# Patient Record
Sex: FEMALE | Race: WHITE
[De-identification: names, ages, dates, MRNs, and addresses within clinical notes are randomized per-mention and may not be internally consistent; named-entity substitution may affect disease eponyms.]

---

## 2018-04-03 ENCOUNTER — HOSPITAL ENCOUNTER (OUTPATIENT)
Dept: HOSPITAL 89 - LAB | Age: 78
End: 2018-04-03
Attending: INTERNAL MEDICINE
Payer: MEDICARE

## 2018-04-03 VITALS — BODY MASS INDEX: 27.72 KG/M2

## 2018-04-03 DIAGNOSIS — E87.6: Primary | ICD-10-CM

## 2018-04-03 DIAGNOSIS — I10: ICD-10-CM

## 2018-04-03 DIAGNOSIS — E03.9: ICD-10-CM

## 2018-04-03 LAB
LDLC SERPL-MCNC: 131 MG/DL
PLATELET COUNT, AUTOMATED: 273 K/UL (ref 150–450)

## 2018-04-03 PROCEDURE — 84155 ASSAY OF PROTEIN SERUM: CPT

## 2018-04-03 PROCEDURE — 82247 BILIRUBIN TOTAL: CPT

## 2018-04-03 PROCEDURE — 84460 ALANINE AMINO (ALT) (SGPT): CPT

## 2018-04-03 PROCEDURE — 83718 ASSAY OF LIPOPROTEIN: CPT

## 2018-04-03 PROCEDURE — 82435 ASSAY OF BLOOD CHLORIDE: CPT

## 2018-04-03 PROCEDURE — 84443 ASSAY THYROID STIM HORMONE: CPT

## 2018-04-03 PROCEDURE — 84075 ASSAY ALKALINE PHOSPHATASE: CPT

## 2018-04-03 PROCEDURE — 82040 ASSAY OF SERUM ALBUMIN: CPT

## 2018-04-03 PROCEDURE — 85025 COMPLETE CBC W/AUTO DIFF WBC: CPT

## 2018-04-03 PROCEDURE — 82374 ASSAY BLOOD CARBON DIOXIDE: CPT

## 2018-04-03 PROCEDURE — 84450 TRANSFERASE (AST) (SGOT): CPT

## 2018-04-03 PROCEDURE — 84520 ASSAY OF UREA NITROGEN: CPT

## 2018-04-03 PROCEDURE — 82465 ASSAY BLD/SERUM CHOLESTEROL: CPT

## 2018-04-03 PROCEDURE — 82310 ASSAY OF CALCIUM: CPT

## 2018-04-03 PROCEDURE — 84295 ASSAY OF SERUM SODIUM: CPT

## 2018-04-03 PROCEDURE — 82947 ASSAY GLUCOSE BLOOD QUANT: CPT

## 2018-04-03 PROCEDURE — 82565 ASSAY OF CREATININE: CPT

## 2018-04-03 PROCEDURE — 84132 ASSAY OF SERUM POTASSIUM: CPT

## 2018-04-03 PROCEDURE — 84478 ASSAY OF TRIGLYCERIDES: CPT

## 2018-04-03 PROCEDURE — 36415 COLL VENOUS BLD VENIPUNCTURE: CPT

## 2018-06-29 ENCOUNTER — HOSPITAL ENCOUNTER (OUTPATIENT)
Dept: HOSPITAL 89 - LAB | Age: 78
End: 2018-06-29
Attending: INTERNAL MEDICINE
Payer: MEDICARE

## 2018-06-29 VITALS — BODY MASS INDEX: 27.72 KG/M2

## 2018-06-29 DIAGNOSIS — R19.7: Primary | ICD-10-CM

## 2018-06-29 PROCEDURE — 87177 OVA AND PARASITES SMEARS: CPT

## 2018-06-29 PROCEDURE — 87324 CLOSTRIDIUM AG IA: CPT

## 2018-06-29 PROCEDURE — 82274 ASSAY TEST FOR BLOOD FECAL: CPT

## 2018-06-29 PROCEDURE — 83630 LACTOFERRIN FECAL (QUAL): CPT

## 2018-06-29 PROCEDURE — 87045 FECES CULTURE AEROBIC BACT: CPT

## 2018-06-29 PROCEDURE — 87449 NOS EACH ORGANISM AG IA: CPT

## 2018-06-29 PROCEDURE — 87338 HPYLORI STOOL AG IA: CPT

## 2019-06-03 ENCOUNTER — HOSPITAL ENCOUNTER (EMERGENCY)
Dept: HOSPITAL 89 - ER | Age: 79
Discharge: HOME | End: 2019-06-03
Payer: MEDICARE

## 2019-06-03 VITALS — SYSTOLIC BLOOD PRESSURE: 153 MMHG | DIASTOLIC BLOOD PRESSURE: 70 MMHG

## 2019-06-03 VITALS — WEIGHT: 160.5 LBS | BODY MASS INDEX: 27.72 KG/M2

## 2019-06-03 DIAGNOSIS — G43.009: Primary | ICD-10-CM

## 2019-06-03 LAB — PLATELET COUNT, AUTOMATED: 233 K/UL (ref 150–450)

## 2019-06-03 PROCEDURE — 71045 X-RAY EXAM CHEST 1 VIEW: CPT

## 2019-06-03 PROCEDURE — 96374 THER/PROPH/DIAG INJ IV PUSH: CPT

## 2019-06-03 PROCEDURE — 82310 ASSAY OF CALCIUM: CPT

## 2019-06-03 PROCEDURE — 99284 EMERGENCY DEPT VISIT MOD MDM: CPT

## 2019-06-03 PROCEDURE — 82435 ASSAY OF BLOOD CHLORIDE: CPT

## 2019-06-03 PROCEDURE — 84520 ASSAY OF UREA NITROGEN: CPT

## 2019-06-03 PROCEDURE — 84132 ASSAY OF SERUM POTASSIUM: CPT

## 2019-06-03 PROCEDURE — 84075 ASSAY ALKALINE PHOSPHATASE: CPT

## 2019-06-03 PROCEDURE — 70450 CT HEAD/BRAIN W/O DYE: CPT

## 2019-06-03 PROCEDURE — 85025 COMPLETE CBC W/AUTO DIFF WBC: CPT

## 2019-06-03 PROCEDURE — 82947 ASSAY GLUCOSE BLOOD QUANT: CPT

## 2019-06-03 PROCEDURE — 82565 ASSAY OF CREATININE: CPT

## 2019-06-03 PROCEDURE — 82040 ASSAY OF SERUM ALBUMIN: CPT

## 2019-06-03 PROCEDURE — 84450 TRANSFERASE (AST) (SGOT): CPT

## 2019-06-03 PROCEDURE — 81001 URINALYSIS AUTO W/SCOPE: CPT

## 2019-06-03 PROCEDURE — 84460 ALANINE AMINO (ALT) (SGPT): CPT

## 2019-06-03 PROCEDURE — 84295 ASSAY OF SERUM SODIUM: CPT

## 2019-06-03 PROCEDURE — 93005 ELECTROCARDIOGRAM TRACING: CPT

## 2019-06-03 PROCEDURE — 84155 ASSAY OF PROTEIN SERUM: CPT

## 2019-06-03 PROCEDURE — 82374 ASSAY BLOOD CARBON DIOXIDE: CPT

## 2019-06-03 PROCEDURE — 82247 BILIRUBIN TOTAL: CPT

## 2019-06-03 PROCEDURE — 96361 HYDRATE IV INFUSION ADD-ON: CPT

## 2019-06-03 NOTE — ER REPORT
History and Physical


Time Seen By MD:  15:26


HPI/ROS


78 y/o pleasant female with a history of migraine headaches, essential tremor, 


depression, and anxiety. She said she started the day with a mild HA. She and 


her daughter went on errands, and had lunch. After lunch her HA worsened, she 


became anxious, nauseous and weak. Now complains of generalized weakness and a 


HA. Not worse of life. No meningismus. No fever/chills. No abdominal pain.


Remainder of the 14 system rev:  Yes


Allergies:  


Coded Allergies:  


     trazodone (Verified  Allergy, Severe, 6/3/19)


     oxycodone (Verified  Allergy, Intermediate, NAUSEA/VOMITING, 6/3/19)


     hydrocodone (Unverified  Allergy, Unknown, NAUSEA/VOMITING, 6/3/19)


     sertraline (Verified  Adverse Reaction, Severe, Nausea, insomnia, anger, 


irritability, 6/3/19)


     tramadol (Verified  Adverse Reaction, Severe, Tremors and jitteriness, 


6/3/19)


     simvastatin (Verified  Adverse Reaction, Intermediate, leg weakness, 


6/3/19)


Home Meds


Active Scripts


Atenolol/Chlorthalidone (ATENOLOL-CHLORTHAL 50-25 TB) 1 Each Tablet, 1 TAB PO 


QDAY, #90 TAB 2 Refills


   Prov:OLY MEDRANO MD         4/29/19


Mirtazapine (MIRTAZAPINE) 7.5 Mg Tablet, 7.5 MG PO DAILY, #90 TAB 3 Refills


   Prov:OLY MEDRANO MD         1/17/19


Alprazolam (ALPRAZOLAM) 0.25 Mg Tab.rapdis, 1 TAB PO BID, #120 TAB 4 Refills


   Take 1 in am, 1 in afternoon and two at night, total of 4/day.


   Prov:LOY MEDRANO MD         1/17/19


Potassium Chloride (KLOR-CON M20) 20 Meq Tab.er.prt, 1 TAB PO TID, #270 TAB 3 


Refills


   Prov:OLY MEDRANO MD         11/1/18


Promethazine Hcl (PROMETHAZINE HCL) 25 Mg Tablet, 25 MG PO TID PRN for 


NAUSEA/VOMITING, #60 TAB 3 Refills


   Prov:OLY MEDRANO MD         10/30/18


Levothyroxine Sodium (LEVOTHYROXINE SODIUM) 75 Mcg Tablet, 75 MCG PO QDAY, #90 


TAB 4 Refills


   Prov:OLY MEDRANO MD         10/30/18


Famotidine (PEPCID) 20 Mg Tablet, 20 MG PO QDAY, #90 TAB 3 Refills


   Prov:OLY MEDRANO MD         4/10/18


Reported Medications


Magnesium Glycinate (MAG GLYCINATE) 100 Mg Tablet, 4 TAB PO DAILY


   1/12/17


Meclizine Hcl (MECLIZINE HCL) 25 Mg Tablet, 1 TAB PO PRN PRN for DIZZINESS


   9/20/16


Vitamin B Complex (B COMPLEX) 1 Each Tablet, 1 EACH PO QDAY, TAB


   11/11/15


Fish Oil/Dha/Epa (FISH OIL 1,200 MG FISH OIL) 1 Each Capsule, 1 EACH PO BID, 


CAPSULE


   11/10/15


Lutein (LUTEIN) 20 Mg Capsule, 1 CAP PO QDAY, CAPSULE


   11/10/15


Aspirin (ASPIRIN) 325 Mg Tablet, 1 TAB PO QDAY, TAB


   11/10/15


Multivitamin (MULTI VITAMIN DAILY) 1 Each Tablet, 1 EACH PO QDAY


   10/5/15


Reviewed Nurses Notes:  Yes


Old Medical Records Reviewed:  Yes


Hx Smoking:  No


Smoking Status:  Never Smoker


Hx Substance Use Disorder:  No


Constitutional





Vital Sign - Last 24 Hours








 6/3/19 6/3/19 6/3/19 6/3/19





 15:20 15:30 15:45 15:46


 


Temp 98.5   


 


Pulse 76 ??? 73 


 


Resp 18  22 


 


B/P (MAP) 190/93 187/97 (127)  185/90 (121)


 


Pulse Ox 90  87 


 


O2 Delivery Room Air   


 


    





 6/3/19 6/3/19 6/3/19 6/3/19





 16:00 16:15 16:30 16:45


 


Pulse  74 73 69


 


Resp 19 32 17 18


 


B/P (MAP) 177/88 (117) 179/99 (125) 167/82 (110) 165/78 (107)


 


Pulse Ox 94   


 


O2 Delivery Nasal Cannula   


 


O2 Flow Rate 1   





 6/3/19 6/3/19 6/3/19 6/3/19





 16:50 16:55 17:00 17:05


 


Pulse ??? 70 68 68


 


Resp 17 7 14 20


 


B/P (MAP)   153/80 (104) 


 


Pulse Ox 82   





 6/3/19 6/3/19 6/3/19 





 17:10 17:15 17:20 


 


Pulse 68 70 68 


 


Resp 16 17 11 


 


B/P (MAP)  153/70 (97)  








Physical Exam


General Appearance: The patient is alert, has no immediate need for airway 


protection and no current signs of toxicity.  


Eyes: Pupils equal and round no injection.


Respiratory: Chest is non tender, lungs are clear to auscultation.


Cardiac:  RRR, no m/r/g


Gastrointestinal: Abdomen is soft and non tender, no masses, bowel sounds 


normal.


Musculoskeletal:  No focal TTP


Neck: Neck is supple and non tender.


Extremities have full range of motion and are non tender.


Skin: No rashes or lesions.


Neuro: CN in tact, normal strength and sensation, gait at baseline. 





DIFFERENTIAL DIAGNOSIS: After history and physical exam differential diagnosis 


was considered for headache including but not limited to subarachnoid 


hemorrhage, migraine headache, tension headache and infectious causes such as 


meningitis, pharyngitis and sinusitis.





Medical Decision Making


Data Points


Result Diagram:  


6/3/19 1555                                                                     


          6/3/19 1538





Laboratory





Hematology








Test


 6/3/19


15:38 6/3/19


15:55 6/3/19


16:25


 


Sodium Level


 135 mmol/L


(137-145) 


 





 


Potassium Level


 3.4 mmol/L


(3.5-5.0) 


 





 


Chloride Level


 98 mmol/L


() 


 





 


Carbon Dioxide Level


 25 mmol/L


(22-31) 


 





 


Blood Urea Nitrogen


 13 mg/dl


(7-18) 


 





 


Creatinine


 0.70 mg/dl


(0.52-1.04) 


 





 


Glomerular Filtration Rate


Calc > 60.0 


 


 





 


Random Glucose


 96 mg/dl


() 


 





 


Calcium Level


 9.5 mg/dl


(8.4-10.2) 


 





 


Total Bilirubin


 0.4 mg/dl


(0.2-1.3) 


 





 


Aspartate Amino Transf


(AST/SGOT) 37 U/L (0-35) 


 


 





 


Alanine Aminotransferase


(ALT/SGPT) 36 U/L (0-56) 


 


 





 


Alkaline Phosphatase 84 U/L (0-126)   


 


Total Protein


 8.3 g/dl


(6.3-8.2) 


 





 


Albumin


 4.6 g/dl


(3.5-5.0) 


 





 


Red Blood Count


 


 4.73 M/uL


(4.17-5.56) 





 


Mean Corpuscular Volume


 


 86.6 fL


(80.0-96.0) 





 


Mean Corpuscular Hemoglobin


 


 30.2 pg


(26.0-33.0) 





 


Mean Corpuscular Hemoglobin


Concent 


 34.9 g/dL


(32.0-36.0) 





 


Red Cell Distribution Width


 


 14.2 %


(11.5-14.5) 





 


Mean Platelet Volume


 


 7.0 fL


(7.2-11.1) 





 


Neutrophils (%) (Auto)


 


 59.7 %


(39.4-72.5) 





 


Lymphocytes (%) (Auto)


 


 27.2 %


(17.6-49.6) 





 


Monocytes (%) (Auto)


 


 10.1 %


(4.1-12.4) 





 


Eosinophils (%) (Auto)


 


 2.0 %


(0.4-6.7) 





 


Basophils (%) (Auto)


 


 1.0 %


(0.3-1.4) 





 


Nucleated RBC Relative Count


(auto) 


 0.0 /100WBC 


 





 


Neutrophils # (Auto)


 


 5.1 K/uL


(2.0-7.4) 





 


Lymphocytes # (Auto)


 


 2.3 K/uL


(1.3-3.6) 





 


Monocytes # (Auto)


 


 0.9 K/uL


(0.3-1.0) 





 


Eosinophils # (Auto)


 


 0.2 K/uL


(0.0-0.5) 





 


Basophils # (Auto)


 


 0.1 K/uL


(0.0-0.1) 





 


Nucleated RBC Absolute Count


(auto) 


 0.00 K/uL 


 





 


Urine Color   Yellow 


 


Urine Clarity


 


 


 Slightly-cloudy





 


Urine pH


 


 


 7.0 pH


(4.8-9.5)


 


Urine Specific Gravity   1.008 


 


Urine Protein


 


 


 Negative mg/dL


(NEGATIVE)


 


Urine Glucose (UA)


 


 


 Negative mg/dL


(NEGATIVE)


 


Urine Ketones


 


 


 Negative mg/dL


(NEGATIVE)


 


Urine Blood


 


 


 Negative


(NEGATIVE)


 


Urine Nitrite


 


 


 Negative


(NEGATIVE)


 


Urine Bilirubin


 


 


 Negative


(NEGATIVE)


 


Urine Urobilinogen


 


 


 Negative mg/dL


(0.2-1.9)


 


Urine Leukocyte Esterase


 


 


 Moderate


(NEGATIVE)


 


Urine RBC


 


 


 3 /HPF


(0-2/HPF)


 


Urine WBC


 


 


 4 /HPF


(0-5/HPF)


 


Urine Squamous Epithelial


Cells 


 


 None /LPF


(NONE-FEW)


 


Urine Bacteria


 


 


 Negative /HPF


(NONE-FEW)


 


Urine Mucus


 


 


 None /HPF


(NONE-FEW)








Chemistry








Test


 6/3/19


15:38 6/3/19


15:55 6/3/19


16:25


 


Glomerular Filtration Rate


Calc > 60.0 


 


 





 


Calcium Level


 9.5 mg/dl


(8.4-10.2) 


 





 


Total Bilirubin


 0.4 mg/dl


(0.2-1.3) 


 





 


Aspartate Amino Transf


(AST/SGOT) 37 U/L (0-35) 


 


 





 


Alanine Aminotransferase


(ALT/SGPT) 36 U/L (0-56) 


 


 





 


Alkaline Phosphatase 84 U/L (0-126)   


 


Total Protein


 8.3 g/dl


(6.3-8.2) 


 





 


Albumin


 4.6 g/dl


(3.5-5.0) 


 





 


White Blood Count


 


 8.6 k/uL


(4.5-11.0) 





 


Red Blood Count


 


 4.73 M/uL


(4.17-5.56) 





 


Hemoglobin


 


 14.3 g/dL


(12.0-16.0) 





 


Hematocrit


 


 41.0 %


(34.0-47.0) 





 


Mean Corpuscular Volume


 


 86.6 fL


(80.0-96.0) 





 


Mean Corpuscular Hemoglobin


 


 30.2 pg


(26.0-33.0) 





 


Mean Corpuscular Hemoglobin


Concent 


 34.9 g/dL


(32.0-36.0) 





 


Red Cell Distribution Width


 


 14.2 %


(11.5-14.5) 





 


Platelet Count


 


 233 K/uL


(150-450) 





 


Mean Platelet Volume


 


 7.0 fL


(7.2-11.1) 





 


Neutrophils (%) (Auto)


 


 59.7 %


(39.4-72.5) 





 


Lymphocytes (%) (Auto)


 


 27.2 %


(17.6-49.6) 





 


Monocytes (%) (Auto)


 


 10.1 %


(4.1-12.4) 





 


Eosinophils (%) (Auto)


 


 2.0 %


(0.4-6.7) 





 


Basophils (%) (Auto)


 


 1.0 %


(0.3-1.4) 





 


Nucleated RBC Relative Count


(auto) 


 0.0 /100WBC 


 





 


Neutrophils # (Auto)


 


 5.1 K/uL


(2.0-7.4) 





 


Lymphocytes # (Auto)


 


 2.3 K/uL


(1.3-3.6) 





 


Monocytes # (Auto)


 


 0.9 K/uL


(0.3-1.0) 





 


Eosinophils # (Auto)


 


 0.2 K/uL


(0.0-0.5) 





 


Basophils # (Auto)


 


 0.1 K/uL


(0.0-0.1) 





 


Nucleated RBC Absolute Count


(auto) 


 0.00 K/uL 


 





 


Urine Color   Yellow 


 


Urine Clarity


 


 


 Slightly-cloudy





 


Urine pH


 


 


 7.0 pH


(4.8-9.5)


 


Urine Specific Gravity   1.008 


 


Urine Protein


 


 


 Negative mg/dL


(NEGATIVE)


 


Urine Glucose (UA)


 


 


 Negative mg/dL


(NEGATIVE)


 


Urine Ketones


 


 


 Negative mg/dL


(NEGATIVE)


 


Urine Blood


 


 


 Negative


(NEGATIVE)


 


Urine Nitrite


 


 


 Negative


(NEGATIVE)


 


Urine Bilirubin


 


 


 Negative


(NEGATIVE)


 


Urine Urobilinogen


 


 


 Negative mg/dL


(0.2-1.9)


 


Urine Leukocyte Esterase


 


 


 Moderate


(NEGATIVE)


 


Urine RBC


 


 


 3 /HPF


(0-2/HPF)


 


Urine WBC


 


 


 4 /HPF


(0-5/HPF)


 


Urine Squamous Epithelial


Cells 


 


 None /LPF


(NONE-FEW)


 


Urine Bacteria


 


 


 Negative /HPF


(NONE-FEW)


 


Urine Mucus


 


 


 None /HPF


(NONE-FEW)








Urinalysis








Test


 6/3/19


16:25


 


Urine Color Yellow 


 


Urine Clarity


 Slightly-cloudy





 


Urine pH


 7.0 pH


(4.8-9.5)


 


Urine Specific Gravity 1.008 


 


Urine Protein


 Negative mg/dL


(NEGATIVE)


 


Urine Glucose (UA)


 Negative mg/dL


(NEGATIVE)


 


Urine Ketones


 Negative mg/dL


(NEGATIVE)


 


Urine Blood


 Negative


(NEGATIVE)


 


Urine Nitrite


 Negative


(NEGATIVE)


 


Urine Bilirubin


 Negative


(NEGATIVE)


 


Urine Urobilinogen


 Negative mg/dL


(0.2-1.9)


 


Urine Leukocyte Esterase


 Moderate


(NEGATIVE)


 


Urine RBC


 3 /HPF


(0-2/HPF)


 


Urine WBC


 4 /HPF


(0-5/HPF)


 


Urine Squamous Epithelial


Cells None /LPF


(NONE-FEW)


 


Urine Bacteria


 Negative /HPF


(NONE-FEW)


 


Urine Mucus


 None /HPF


(NONE-FEW)











ED Course/Re-evaluation


ED Course


No focal neuro deficits. No new findings on CT head. No fever to suggest 


infectious cause of HA. HA improved with Toradol. No chest pain, abdominal pain,


SOB, n/v/d. Was at her baseline earlier today other than her HA. Ambulated in 


the ED, and daughter states her gait is at baseline. No UTI, no PNA. No need for


further imaging. I think she has a migraine HA which caused her to become 


anxious. She states that she feels better. Her daughter watches her closely, and


will monitor her over the next few days. She will follow up with Dr. Medrano.


Decision to Disposition Date:  David 3, 2019


Decision to Disposition Time:  17:47





Depart


Departure


Latest Vital Signs





Vital Signs








  Date Time  Temp Pulse Resp B/P (MAP) Pulse Ox O2 Delivery O2 Flow Rate FiO2


 


6/3/19 17:20  68 11     


 


6/3/19 17:15    153/70 (97)    


 


6/3/19 16:50     82   


 


6/3/19 16:00      Nasal Cannula 1 


 


6/3/19 15:20 98.5       








Impression:  


   Primary Impression:  


   Migraine headache


Condition:  Improved


Disposition:  HOME OR SELF-CARE


Referrals:  


OLY MEDRANO MD (PCP)


Patient Instructions:  Migraine Headache (ED)





Problem Qualifiers








   Primary Impression:  


   Migraine headache


   Migraine type:  without aura  Status migrainosus presence:  without status 


   migrainosus  Intractability:  not intractable  Qualified Codes:  G43.009 - 


   Migraine without aura, not intractable, without status migrainosus








HEATHER EARLY MD                  David 3, 2019 15:26

## 2019-06-03 NOTE — RADIOLOGY IMAGING REPORT
FACILITY: [facility] [place] 

 

PATIENT NAME: Celia Maurer

: 1940

MR: [id number]

V: [id number]

EXAM DATE: 

ORDERING PHYSICIAN: HEATHER EARLY

TECHNOLOGIST: 

 

Location: [facility]

Patient: Celia Maurer

: 1940

MRN: [id number]

Visit/Account:[id number]

Date of Sevice:  2019

 

ACCESSION #: [id number]

 

Exam type: CHEST SINGLE AP

 

History: generalized weakness, chills

 

Comparison: 2014.

 

Findings:

 

The lungs are free of acute effusions, infiltrates or edema.  The cardiac silhouette is normal in siz
e.  There is mild ectasia the thoracic aorta.  Extensive vascular calcifications are seen in both ashely
es of the neck.

 

IMPRESSION:

 

1.  No acute cardiopulmonary process is seen

 

Extensive vascular calcifications are seen in both sides of the neck

 

Report Dictated By: Jennifer Romero MD at 6/3/2019 4:44 PM

 

Report E-Signed By: Jennifer Romero MD  at 6/3/2019 4:45 PM

 

WSN:[id number]

## 2019-06-03 NOTE — EKG
FACILITY: [facility] [place] 

 

PATIENT NAME: MARIELLE MERCADO

: [id number]

MR: [id number]

V: [id number]

EXAM DATE: 

ORDERING PHYSICIAN: HEATHER EARLY

TECHNOLOGIST: 

 

Test Reason : 

Blood Pressure : ***/*** mmHG

Vent. Rate : 070 BPM     Atrial Rate : 070 BPM

   P-R Int : 218 ms          QRS Dur : 094 ms

    QT Int : 398 ms       P-R-T Axes : 048 -15 004 degrees

   QTc Int : 429 ms

 

Sinus rhythm with 1st degree AV block

Left axis

Nonspecific interventricular conduction delay

Nonspecific T wave findings which are similar to previous EKGs

Abnormal ECG

 

Confirmed by KAEL GARCIA ([id number]) on 6/3/2019 8:20:28 PM

 

Referred By:             Confirmed By:KAEL GARCIA

## 2019-06-03 NOTE — RADIOLOGY IMAGING REPORT
FACILITY: [facility] 

 

PATIENT NAME: Celia Maurer

: 1940

MR: [id number]

V: [id number]

EXAM DATE: 

ORDERING PHYSICIAN: HEATHER EARLY

TECHNOLOGIST: 

 

Location: [facility] [place]

Patient: Celia Maurer

: 1940

MRN: [id number]

Visit/Account:[id number]

Date of Sevice:  2019

 

ACCESSION #: [id number]

 

Head CT scan without contrast

 

COMPARISONS: None

 

ADDITIONAL PERTINENT HISTORY: Headache with altered mental status.

 

TECHNIQUE:  Multiple axial images were obtained from the skull base to the vertex without IV contrast
. One of the following dose optimization techniques was utilized in the performance of this exam: Aut
omated exposure control; adjustment of the mA and/or kV according to the patient's size; or use of an
 iterative  reconstruction technique.  Specific details can be referenced in the facility's radiology
 CT exam operational policy.

 

FINDINGS:

 

Midline shift: Negative

 

Ventricles:  Mild enlargement of the lateral and third ventricles.

 

Brain parenchyma:  Patchy hypoattenuation within the periventricular and subcortical white matter, no
nspecific but likely representing small vessel ischemic change on a chronic basis. No intraparenchyma
l hemorrhage.

 

Extra-axial spaces:  Mild cerebral atrophy.

 

Intracranial vasculature:  Negative

 

Osseous structures:  Negative

 

Paranasal sinuses and mastoid air cells:  Negative

 

Surrounding soft tissues and orbits:  Negative

 

 

IMPRESSION:

1. Age related changes as described above.

2. No evidence of acute intracranial pathology.

 

Report Dictated By: Robinson Solo MD at 6/3/2019 3:40 PM

 

Report E-Signed By: Robinson Solo MD  at 6/3/2019 3:42 PM

 

WSN:[id number]